# Patient Record
Sex: FEMALE | Race: WHITE | NOT HISPANIC OR LATINO | URBAN - METROPOLITAN AREA
[De-identification: names, ages, dates, MRNs, and addresses within clinical notes are randomized per-mention and may not be internally consistent; named-entity substitution may affect disease eponyms.]

---

## 2021-05-27 ENCOUNTER — RECORDS - HEALTHEAST (OUTPATIENT)
Dept: ADMINISTRATIVE | Facility: CLINIC | Age: 56
End: 2021-05-27

## 2025-04-15 ENCOUNTER — OFFICE VISIT (OUTPATIENT)
Dept: AUDIOLOGY | Facility: CLINIC | Age: 60
End: 2025-04-15
Payer: COMMERCIAL

## 2025-04-15 DIAGNOSIS — H90.3 SENSORINEURAL HEARING LOSS (SNHL), BILATERAL: Primary | ICD-10-CM

## 2025-04-15 NOTE — PROGRESS NOTES
AUDIOLOGY REPORT    SUBJECTIVE: Geovanna Isaacs is a 59 year old female was seen in the Audiology Clinic at the [facility] on 4/15/25 to discuss concerns with hearing and functional communication difficulties. Geovanna has been seen previously on 1/28/2025 at [facility] in [place], and results revealed a borderline normal sloping to mild sensorineural hearing loss in the left ear and mild sloping to moderate sensorineural hearing loss rising to normal hearing in the right ear. The patient was medically evaluated and determined to be cleared for binaural hearing aids by Gabriela Acosta DO.  The patient is interested in hearing aids for her bothersome tinnitus.  The patient works as a  and splits her time between the office and working from home.  Geovanna has difficulty hearing at work as people are soft talkers and uses a headset while working.  She also notes difficulty watching TV. Geovanna's mom has hearing aids which she thinks were obtained from [employer]. Geovanna's tinnitus is constant but it is not disruptive to her sleep.        OBJECTIVE: The patient is a hearing aid candidate and they would like to move forward with a hearing aid evaluation today. Therefore, the patient was presented with different options for amplification to help aid in communication. Styles, levels of technology, monaural vs. binaural fitting, and phone compatibility were discussed.     The hearing aid(s) mutually chosen were:  Binaural: ReSound Vivia 5  COLOR: 74  BATTERY SIZE: rechargeable  EARMOLD/TIPS: small closed  CANAL/ LENGTH: 2LP  ACCESSORY: Remote microphone (free)        ASSESSMENT: Reviewed purchase information and warranty information with patient. The 45 day trial period was explained to patient. The patient was given a copy of the [place] [employer] consumer brochure on purchasing hearing instruments. Patient risk factors have been provided to the patient in  writing prior to the sale of the hearing aid per FDA regulation. The risk factors are also available in the User Instructional Booklet to be presented on the day of the hearing aid fitting. Hearing aid(s) ordered. Hearing aid evaluation completed.      PLAN: Geovanna is scheduled to return on 5/13/2025 for hearing aid fitting and programming. Purchase agreement will be completed on that date. Please contact this clinic with any questions or concerns.      Cristina Britton, CCC-A  Clinical Audiologist  [place] #[id number]     Please note that the above dictation was created with voice recognition software and may contain typographic errors.

## 2025-05-13 ENCOUNTER — OFFICE VISIT (OUTPATIENT)
Dept: AUDIOLOGY | Facility: CLINIC | Age: 60
End: 2025-05-13
Payer: COMMERCIAL

## 2025-05-13 DIAGNOSIS — H90.3 SENSORINEURAL HEARING LOSS (SNHL), BILATERAL: Primary | ICD-10-CM

## 2025-05-13 NOTE — PROGRESS NOTES
AUDIOLOGY REPORT    SUBJECTIVE: Geovanna Isaacs is a 59 year old female who was seen in the Audiology Clinic at the [facility] on 5/13/25 for a fitting of bilateral ReSound Vivia 5-R hearing aids. Geovanna has been seen previously on 1/28/2025 at [facility] in [place], and results revealed a borderline normal sloping to mild sensorineural hearing loss in the left ear and mild sloping to moderate sensorineural hearing loss rising to normal hearing in the right ear. The patient was medically evaluated and determined to be cleared for binaural hearing aids by Gabriela Acosta DO.  Of note, the patient has bothersome tinnitus.      OBJECTIVE: The hearing aid conformity evaluation was completed. The hearing aids were placed and they provided a good fit. Real-ear-probe-microphone measurements were completed on the Sellywhere system and were a good match to NAL-NL1 targets with soft sounds audible, moderate sounds comfortable, and loud sounds below discomfort. UCLs are verified through maximum power output measures and demonstrate appropriate limiting of loud inputs.  The hearing aids were initially programmed on the ear, but the programming was lost upon auto relating the tinnitus program; therefore the hearing aids were reprogrammed in the test box.  Geovanna was oriented to proper hearing aid use, care, cleaning (no water, dry brush), batteries (size: BATTERY SIZE: rechargeable, insertion/removal, toxicity, low-battery signal), aid insertion/removal, user booklet, warranty information, storage cases, and other hearing aid details. The patient confirmed understanding of hearing aid use and care, and showed proper insertion of hearing aid and batteries while in the office today. Geovanna reported good volume and sound quality today.  Her hearing aids were connected to the marlon which was demonstrated and discussed.  She is not interested in streaming phone calls or media through her hearing aids  at this time.  Due to time constraints, we were unable to discuss the multi microphone.    Hearing aids were programmed as follows:  Program 1: All Around  Program 2: Hear in Noise  Program 3: All Around+ Tinnitus Sound Generator    EAR(S) FIT: Bilateral  HEARING AID MODEL NAME: ReSnam Del Cid 5   HEARING AID STYLE: -in-the-ear behind-the-ear  EARMOLDS/TIP: small closed  SERIAL NUMBERS: Right: [id number] Left: [id number]  WARRANTY END DATE: 5/18/2028      The patient's 45-day trial period ends on 6/30/25.       ASSESSMENT: ReSound hearing aid(s) were fit today. Verification measures were performed. Geovanna signed the Hearing Aid Purchase Agreement and was given a copy, as well as details on her hearing aids. Patient was counseled that exact out of pocket amounts cannot be determined for hearing aid claims being sent to insurance. Any insurance coverage information presented to the patient is an estimate only, and is not a guarantee of payment. Patient has been advised to check with their own insurance.      PLAN: Due to the construction and traffic, the patient would like to have her initial review appointment at the [facility] as it is more convenient to her home.  At the next appointment, Geovanna aid review appointment at which time cleaning, the tinnitus sound generator, and the multi microphone will be discussed. During their trial period they are encouraged to keep a list of listening situations that can be improved. Those concerns will be addressed at the next appointment. Please call this clinic with questions regarding today s appointment.    Cristina Britton, CCC-A  Clinical Audiologist  [place] #[id number]     Please note that the above dictation was created with voice recognition software and may contain typographic errors.

## 2025-07-29 ENCOUNTER — OFFICE VISIT (OUTPATIENT)
Dept: AUDIOLOGY | Facility: CLINIC | Age: 60
End: 2025-07-29
Payer: COMMERCIAL

## 2025-07-29 DIAGNOSIS — H93.13 TINNITUS OF BOTH EARS: ICD-10-CM

## 2025-07-29 DIAGNOSIS — H90.3 SENSORINEURAL HEARING LOSS (SNHL), BILATERAL: Primary | ICD-10-CM

## 2025-07-29 PROCEDURE — V5299 HEARING SERVICE: HCPCS | Performed by: AUDIOLOGIST

## 2025-07-29 NOTE — PROGRESS NOTES
"Hearing aid check (devices in warranty through 5-18-28)    Patient was fit binaurally with ReSound Vivia 5 CELIO devices 5-13-25 at the [facility]. She continues to be bothered by internal sounds (chewing; the sound of her joints cracking, water running, keyboard clicks) and the general tinny sound quality of the devices. She indicated these sounds are \"intolerable\" and generate anxiety in her that hadn't historically been present previously. She does wish to keep the devices, however, as she recognizes some benefits from them in hearing at home and in the workplace, and continues to be considerably bothered by her tinnitus. She requested that the initial/default settings (when devices turn on) be reduced/modified, as she currently must use the marlon to make the volume more tolerable when first putting them on each day.     Devices were in good physical condition and very clean. She is currently using the smallest dome size available for the most open fit possible to reduce occlusion effect. The devices were connected to 's fitting software. Devices were set at 90% targets at previous connection; this was not modified today. Gain was decreased for 3966-6234 Hz markedly. Synchronized Soft Switching was added to the first program (All Around). Programming adjustments were saved, devices were disconnected from fitting software, and we went to the waiting area to experiment with these settings in a noisier environment (multiple people talking, ambient music playing, child was crying, other children were playing with toys that squeaked). She felt more comfortable with the changes made, even with the higher noise levels present.    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 She currently removes the hearing aids while making a call, replaces them, removing them again as needed for work calls. She finds this also " stressful, with the added worry of misplacing the devices while busy with other tasks. She will look into pairing the devices to her work computer, and feels confident of being able to do so after the process was described. She will likely make a test call with a colleague first, to ensure that she is heard easily by the caller using the microphones of the hearing aids to  her voice.    Information was given to Ms. Isaacs about the [facility] in [place] and [place]. While this is a clinic not affiliated with [facility], they do offer various therapies and devices (other than amplification) which may aid her in learning to live with her tinnitus and not let it drive her feelings. She felt this might be something she'd be interested in investigating further.    Further hearing aid checks may be made on an as-needed basis. Ms. Isaacs expressed verbal understanding of this information and plan.     Luiza Batista., [facility]  [place] Licensed Audiologist [id number]